# Patient Record
Sex: MALE | Race: WHITE | URBAN - METROPOLITAN AREA
[De-identification: names, ages, dates, MRNs, and addresses within clinical notes are randomized per-mention and may not be internally consistent; named-entity substitution may affect disease eponyms.]

---

## 2017-10-31 ENCOUNTER — TELEPHONE (OUTPATIENT)
Dept: FAMILY MEDICINE CLINIC | Facility: CLINIC | Age: 22
End: 2017-10-31

## 2017-10-31 NOTE — TELEPHONE ENCOUNTER
As per release received from Dr. Hilda Pulido at [facility], sent OVs from 2012 & 2013 (last seen) and immunizations to [address] [place] [address] West: [phone].